# Patient Record
Sex: MALE | Race: OTHER | NOT HISPANIC OR LATINO
[De-identification: names, ages, dates, MRNs, and addresses within clinical notes are randomized per-mention and may not be internally consistent; named-entity substitution may affect disease eponyms.]

---

## 2024-02-14 ENCOUNTER — APPOINTMENT (OUTPATIENT)
Dept: OTOLARYNGOLOGY | Facility: CLINIC | Age: 24
End: 2024-02-14
Payer: SELF-PAY

## 2024-02-14 VITALS — HEIGHT: 68 IN | BODY MASS INDEX: 26.52 KG/M2 | WEIGHT: 175 LBS

## 2024-02-14 DIAGNOSIS — Z78.9 OTHER SPECIFIED HEALTH STATUS: ICD-10-CM

## 2024-02-14 DIAGNOSIS — H81.10 BENIGN PAROXYSMAL VERTIGO, UNSPECIFIED EAR: ICD-10-CM

## 2024-02-14 DIAGNOSIS — H73.891 OTHER SPECIFIED DISORDERS OF TYMPANIC MEMBRANE, RIGHT EAR: ICD-10-CM

## 2024-02-14 DIAGNOSIS — H90.11 CONDUCTIVE HEARING LOSS, UNILATERAL, RIGHT EAR, WITH UNRESTRICTED HEARING ON THE CONTRALATERAL SIDE: ICD-10-CM

## 2024-02-14 PROBLEM — Z00.00 ENCOUNTER FOR PREVENTIVE HEALTH EXAMINATION: Status: ACTIVE | Noted: 2024-02-14

## 2024-02-14 PROCEDURE — 99204 OFFICE O/P NEW MOD 45 MIN: CPT

## 2024-02-14 PROCEDURE — 92567 TYMPANOMETRY: CPT

## 2024-02-14 PROCEDURE — 92557 COMPREHENSIVE HEARING TEST: CPT

## 2024-02-14 PROCEDURE — 92504 EAR MICROSCOPY EXAMINATION: CPT

## 2024-02-14 NOTE — CONSULT LETTER
[Please see my note below.] : Please see my note below. [FreeTextEntry2] : Dear NORMA BATISTA  [FreeTextEntry1] : Thank you for allowing me to participate in the care of DEA MORROW . Please see the attached visit note.    Melchor Vance Otology Medical Director of [facility] Department of Otolaryngology [facility]

## 2024-02-14 NOTE — PHYSICAL EXAM
[FreeTextEntry1] : Procedure: Microscopic Ear Exam  Left ear:   Tympanic membrane intact.  2 areas of atrophy consistent with prior perforation are present.  No active inflammation present.  No effusion present.   Right ear:   Large central tympanic membrane perforation, 50%.  No active inflammation.  Venancio Hallpike Testing was performed: no induced nystagmus or vertigo was identified in the head-right, head-left, or head-back positioning. [Midline] : trachea located in midline position [] : Romberg test is negative [Normal] : no rashes

## 2024-02-14 NOTE — HISTORY OF PRESENT ILLNESS
[de-identified] : DEA MORROW has a history of right tympanic perforation.  This is of uncertain etiology and of uncertain duration possibly his whole life.  No significant history of ear infections reported.  Significant hearing loss is present. Also reports episodic vertigo upon arising in the morning hours lasting minutes to hours. Interfere with daily activities.  This occurs 1 or 2 times per month.

## 2024-02-14 NOTE — DATA REVIEWED
[de-identified] : In light of the patients current symptoms, Complete audiometry was ordered and completed today. I have interpreted these results and reviewed them in detail with the patient.  Moderate conductive hearing loss right ear. [de-identified] : Prior medical records including vestibular testing and audiometry reviewed.

## 2024-02-14 NOTE — ASSESSMENT
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  Management options carefully reviewed with the patient in detail.  He wished to proceed with surgical planning.  All risks limitations, complications and alternatives reviewed in detail.  Questions answered.  Symptoms of benign positional vertigo.  This was not reproduced today.  I have discussed this with him including other possible etiologies.  I have recommended a trial with home vestibular exercises.  Further management if symptoms persist recommended.  Surgical plan: Right tympanoplasty, cartilage graft.

## 2024-02-15 PROBLEM — H90.11 CONDUCTIVE HEARING LOSS OF RIGHT EAR WITH UNRESTRICTED HEARING OF LEFT EAR: Status: ACTIVE | Noted: 2024-02-15

## 2024-03-07 ENCOUNTER — TRANSCRIPTION ENCOUNTER (OUTPATIENT)
Age: 24
End: 2024-03-07

## 2024-03-08 ENCOUNTER — OUTPATIENT (OUTPATIENT)
Dept: OUTPATIENT SERVICES | Facility: HOSPITAL | Age: 24
LOS: 1 days | Discharge: HOME | End: 2024-03-08

## 2024-03-08 ENCOUNTER — TRANSCRIPTION ENCOUNTER (OUTPATIENT)
Age: 24
End: 2024-03-08

## 2024-03-08 ENCOUNTER — APPOINTMENT (OUTPATIENT)
Dept: OTOLARYNGOLOGY | Facility: AMBULATORY SURGERY CENTER | Age: 24
End: 2024-03-08
Payer: SELF-PAY

## 2024-03-08 VITALS
DIASTOLIC BLOOD PRESSURE: 55 MMHG | RESPIRATION RATE: 16 BRPM | OXYGEN SATURATION: 97 % | TEMPERATURE: 98 F | SYSTOLIC BLOOD PRESSURE: 103 MMHG | HEART RATE: 59 BPM

## 2024-03-08 VITALS
OXYGEN SATURATION: 100 % | DIASTOLIC BLOOD PRESSURE: 69 MMHG | TEMPERATURE: 98 F | HEIGHT: 68 IN | RESPIRATION RATE: 16 BRPM | HEART RATE: 62 BPM | WEIGHT: 186.29 LBS | SYSTOLIC BLOOD PRESSURE: 112 MMHG

## 2024-03-08 PROCEDURE — 21235 EAR CARTILAGE GRAFT: CPT

## 2024-03-08 PROCEDURE — 69631 REPAIR EARDRUM STRUCTURES: CPT

## 2024-03-08 DEVICE — SURGIFOAM PAD 2CM X 6CM X 7MM (12-7): Type: IMPLANTABLE DEVICE | Status: FUNCTIONAL

## 2024-03-08 DEVICE — BONE WAX 2.5GM: Type: IMPLANTABLE DEVICE | Status: FUNCTIONAL

## 2024-03-08 DEVICE — FIBER BEAM PATH OTO-S: Type: IMPLANTABLE DEVICE | Status: FUNCTIONAL

## 2024-03-08 RX ORDER — FENTANYL CITRATE 50 UG/ML
50 INJECTION INTRAVENOUS
Refills: 0 | Status: DISCONTINUED | OUTPATIENT
Start: 2024-03-08 | End: 2024-03-08

## 2024-03-08 RX ORDER — ACETAMINOPHEN 500 MG
1000 TABLET ORAL ONCE
Refills: 0 | Status: COMPLETED | OUTPATIENT
Start: 2024-03-08 | End: 2024-03-08

## 2024-03-08 RX ORDER — CEFADROXIL 500 MG/1
500 CAPSULE ORAL TWICE DAILY
Qty: 10 | Refills: 1 | Status: ACTIVE | COMMUNITY
Start: 2024-03-08 | End: 1900-01-01

## 2024-03-08 RX ORDER — ACETAMINOPHEN AND CODEINE PHOSPHATE 300; 30 MG/1; MG/1
300-30 TABLET ORAL EVERY 6 HOURS
Qty: 12 | Refills: 0 | Status: ACTIVE | COMMUNITY
Start: 2024-03-08 | End: 1900-01-01

## 2024-03-08 RX ORDER — HYDROMORPHONE HYDROCHLORIDE 2 MG/ML
0.5 INJECTION INTRAMUSCULAR; INTRAVENOUS; SUBCUTANEOUS
Refills: 0 | Status: DISCONTINUED | OUTPATIENT
Start: 2024-03-08 | End: 2024-03-08

## 2024-03-08 RX ORDER — SODIUM CHLORIDE 9 MG/ML
500 INJECTION, SOLUTION INTRAVENOUS
Refills: 0 | Status: DISCONTINUED | OUTPATIENT
Start: 2024-03-08 | End: 2024-03-08

## 2024-03-08 RX ORDER — SODIUM CHLORIDE 9 MG/ML
1000 INJECTION, SOLUTION INTRAVENOUS ONCE
Refills: 0 | Status: DISCONTINUED | OUTPATIENT
Start: 2024-03-08 | End: 2024-03-08

## 2024-03-08 RX ORDER — OXYCODONE HYDROCHLORIDE 5 MG/1
5 TABLET ORAL ONCE
Refills: 0 | Status: DISCONTINUED | OUTPATIENT
Start: 2024-03-08 | End: 2024-03-08

## 2024-03-08 RX ORDER — APREPITANT 80 MG/1
40 CAPSULE ORAL ONCE
Refills: 0 | Status: COMPLETED | OUTPATIENT
Start: 2024-03-08 | End: 2024-03-08

## 2024-03-08 RX ADMIN — Medication 1000 MILLIGRAM(S): at 09:36

## 2024-03-08 RX ADMIN — APREPITANT 40 MILLIGRAM(S): 80 CAPSULE ORAL at 09:36

## 2024-03-08 NOTE — ASU DISCHARGE PLAN (ADULT/PEDIATRIC) - NS MD DC FALL RISK RISK
For information on Fall & Injury Prevention, visit: [email or web address] OR  [email or web address] OR  https://www.cdc.gov/steadi/patient.html

## 2024-03-08 NOTE — PRE-ANESTHESIA EVALUATION ADULT - NSANTHADDINFOFT_GEN_ALL_CORE
Pt accepts all anesthesia risks .IBNLT: Dental, Pharyngeal, Laryngeal, Tracheal Trauma, Sore Throat, Hoarseness, Recurrent Laryngeal Nerve Dysfunction, Upper and Lower Extremity Paresthesias, Nausea and Vomiting, Potential exacerbation of asthma and HANNA.

## 2024-03-09 NOTE — PRE-ANESTHESIA EVALUATION ADULT - WEIGHT IN LBS
Per family pt was repeating questions over and over again. Pt reports anxiey. Hx htn. Last known normal 0930.  
186.2

## 2024-03-18 ENCOUNTER — APPOINTMENT (OUTPATIENT)
Dept: OTOLARYNGOLOGY | Facility: CLINIC | Age: 24
End: 2024-03-18
Payer: SELF-PAY

## 2024-03-18 PROCEDURE — 99024 POSTOP FOLLOW-UP VISIT: CPT

## 2024-03-18 NOTE — HISTORY OF PRESENT ILLNESS
[de-identified] :  DEA MORROW has a history of right tympanic perforation. This is of uncertain etiology and of uncertain duration possibly his whole life. No significant history of ear infections reported. Significant hearing loss is present. Also reports episodic vertigo upon arising in the morning hours lasting minutes to hours. Interfere with daily activities. This occurs 1 or 2 times per month.  [FreeTextEntry1] : 3/18/2024 History Post operative visit. Reports mild pain.  No significant tinnitus.  No significant vertigo.  No bleeding reported. compliant with wound care  Physical Exam  Face: Normal Function bilaterally  Oral: Normal  Neck: No mass, Full range of motion   Microscopic Ear Exam Right Ear:  Tragal incision intact, not inflammed Clean gelfoam fills the ear canal. No significant inflammation noted.    [place] Hearing Assessment 512 Hz: Hernandez test: referred to the right ear/midline  Postoperative instructions reviewed with the patient in detail. Followup plans discussed.

## 2024-04-08 ENCOUNTER — APPOINTMENT (OUTPATIENT)
Dept: OTOLARYNGOLOGY | Facility: CLINIC | Age: 24
End: 2024-04-08
Payer: SELF-PAY

## 2024-04-08 DIAGNOSIS — H93.299 OTHER ABNORMAL AUDITORY PERCEPTIONS, UNSPECIFIED EAR: ICD-10-CM

## 2024-04-08 DIAGNOSIS — H72.91 UNSPECIFIED PERFORATION OF TYMPANIC MEMBRANE, RIGHT EAR: ICD-10-CM

## 2024-04-08 PROCEDURE — 92557 COMPREHENSIVE HEARING TEST: CPT

## 2024-04-08 PROCEDURE — 92567 TYMPANOMETRY: CPT

## 2024-04-08 PROCEDURE — 99024 POSTOP FOLLOW-UP VISIT: CPT

## 2024-04-08 NOTE — HISTORY OF PRESENT ILLNESS
[de-identified] :  DEA MORROW has a history of right tympanic perforation. This is of uncertain etiology and of uncertain duration possibly his whole life. No significant history of ear infections reported. Significant hearing loss is present. Also reports episodic vertigo upon arising in the morning hours lasting minutes to hours. Interfere with daily activities. This occurs 1 or 2 times per month.  [FreeTextEntry1] :  04/08/2024 History Post operative visit. Reports No pain or otorrhea. Compliant with wound care  Physical Exam  Microscopic Ear Exam Right Ear:  Residual clean gelfoam partially debrided.  No significant inflammation noted.  The tympanic membrane appears intact.  Complete audiometry was ordered and completed today. This was separately reported by the audiologist. The results were reviewed in detail with the patient.    Postoperative instructions reviewed with the patient in detail. Followup plans discussed.

## 2024-08-12 ENCOUNTER — APPOINTMENT (OUTPATIENT)
Dept: OTOLARYNGOLOGY | Facility: CLINIC | Age: 24
End: 2024-08-12
